# Patient Record
Sex: MALE | Race: OTHER | NOT HISPANIC OR LATINO | URBAN - METROPOLITAN AREA
[De-identification: names, ages, dates, MRNs, and addresses within clinical notes are randomized per-mention and may not be internally consistent; named-entity substitution may affect disease eponyms.]

---

## 2022-08-17 ENCOUNTER — EMERGENCY (EMERGENCY)
Facility: HOSPITAL | Age: 32
LOS: 1 days | Discharge: HOME | End: 2022-08-17
Attending: EMERGENCY MEDICINE
Payer: SELF-PAY

## 2022-08-17 VITALS
DIASTOLIC BLOOD PRESSURE: 96 MMHG | TEMPERATURE: 98 F | HEART RATE: 78 BPM | SYSTOLIC BLOOD PRESSURE: 144 MMHG | RESPIRATION RATE: 18 BRPM | OXYGEN SATURATION: 97 %

## 2022-08-17 PROCEDURE — 12004 RPR S/N/AX/GEN/TRK7.6-12.5CM: CPT

## 2022-08-17 PROCEDURE — 99283 EMERGENCY DEPT VISIT LOW MDM: CPT | Mod: 25

## 2022-08-17 PROCEDURE — 90715 TDAP VACCINE 7 YRS/> IM: CPT

## 2022-08-17 PROCEDURE — 90471 IMMUNIZATION ADMIN: CPT

## 2022-08-17 PROCEDURE — 99053 MED SERV 10PM-8AM 24 HR FAC: CPT

## 2022-08-17 RX ORDER — TETANUS TOXOID, REDUCED DIPHTHERIA TOXOID AND ACELLULAR PERTUSSIS VACCINE, ADSORBED 5; 2.5; 8; 8; 2.5 [IU]/.5ML; [IU]/.5ML; UG/.5ML; UG/.5ML; UG/.5ML
0.5 SUSPENSION INTRAMUSCULAR ONCE
Refills: 0 | Status: COMPLETED | OUTPATIENT
Start: 2022-08-17 | End: 2022-08-17

## 2022-08-17 RX ADMIN — TETANUS TOXOID, REDUCED DIPHTHERIA TOXOID AND ACELLULAR PERTUSSIS VACCINE, ADSORBED 0.5 MILLILITER(S): 5; 2.5; 8; 8; 2.5 SUSPENSION INTRAMUSCULAR at 04:50

## 2022-08-17 NOTE — ED PROVIDER NOTE - NSFOLLOWUPINSTRUCTIONS_ED_ALL_ED_FT
- Please return in 7-10 days for removal of staples    Laceration    A laceration is a cut that goes through all of the layers of the skin and into the tissue that is right under the skin. Some lacerations heal on their own. Others need to be closed with skin adhesive strips, skin glue, stitches (sutures), or staples. Proper laceration care minimizes the risk of infection and helps the laceration to heal better.  If non-absorbable stitches or staples have been placed, they must be taken out within the time frame instructed by your healthcare provider.    SEEK IMMEDIATE MEDICAL CARE IF YOU HAVE ANY OF THE FOLLOWING SYMPTOMS: swelling around the wound, worsening pain, drainage from the wound, red streaking going away from your wound, inability to move finger or toe near the laceration, or discoloration of skin near the laceration.

## 2022-08-17 NOTE — ED PROVIDER NOTE - PATIENT PORTAL LINK FT
You can access the FollowMyHealth Patient Portal offered by [facility] by registering at the following website: [email or web address]. By joining [facility]’s FollowMyHealth portal, you will also be able to view your health information using other applications (apps) compatible with our system.

## 2022-08-17 NOTE — ED ADULT NURSE NOTE - OBJECTIVE STATEMENT
PT A&OX4 states he was at home drinking with GF and tipped and fell and hit his head on a lock. pt currently having lac repaired by MD tetanus shot given and pt being dc'd home after suture repair

## 2022-08-17 NOTE — ED PROVIDER NOTE - PHYSICAL EXAMINATION
Gen: no acute distress  Head: normocephalic, +10cm straight frontal scalp laceration  EENT: EOMI  Lung: no increased work of breathing  MSK: No edema, no visible deformities, full range of motion in all 4 extremities  Neuro: No focal neurologic deficits

## 2022-08-17 NOTE — ED PROVIDER NOTE - OBJECTIVE STATEMENT
32yM with no significant pmh presenting with scalp laceration. Patient reports drinking ETOH today. He reports tripping and scrapping the top of his head on a locker. Denies LOC, blood thinner use, krishna head strike. Last tetanus unknown. Denies N/V, headache, changes in vision, problems with gait.

## 2022-08-17 NOTE — ED PROVIDER NOTE - ATTENDING CONTRIBUTION TO CARE
Lois: I performed a face to face bedside interview with patient regarding history of present illness, review of symptoms and past medical history. I completed an independent physical exam.  I have discussed patient's plan of care with resident.   I agree with note as stated above including HISTORY OF PRESENT ILLNESS, HIV, PAST MEDICAL/SURGICAL/FAMILY/SOCIAL HISTORY, ALLERGIES AND HOME MEDICATIONS, REVIEW OF SYSTEMS, PHYSICAL EXAM, MEDICAL DECISION MAKING and any PROGRESS NOTES during the time I functioned as the attending physician for this patient unless otherwise noted. My brief assessment is as follows: Lois WARE: 32M p/w scalp laceration. Was drinking earlier and fell scraping his scalp. Unknown  tetanus. Denies LOC, vomiting, vision changes, additional injuries. Ambulating well. AAOx3. Plan for tdap, laceration repair.

## 2022-08-17 NOTE — ED ADULT NURSE NOTE - CHIEF COMPLAINT QUOTE
BIBEMS from [place] for laceration s/p mechanical trip and fall. Pt states that he was drinking earlier tonight when he tripped, fell and hit his head on a locker. Large laceration noted to the top of the head, bleeding controlled, clean/dry dressing in place. Denies LOC, denies anticoagulation. Pt is neurologically intact and walking with steady gait, denies HA/dizziness/vision changes and N/V.

## 2022-08-17 NOTE — ED PROVIDER NOTE - CLINICAL SUMMARY MEDICAL DECISION MAKING FREE TEXT BOX
Lois WARE: 32M p/w scalp laceration. Was drinking earlier and fell scraping his scalp. Unknown  tetanus. Denies LOC, vomiting, vision changes, additional injuries. Ambulating well. AAOx3. Plan for tdap, laceration repair.

## 2022-09-07 NOTE — ED ADULT TRIAGE NOTE - CHIEF COMPLAINT QUOTE
[Time Spent: ___ minutes] : I have spent [unfilled] minutes of time on the encounter. BIBEMS from [place] for laceration s/p mechanical trip and fall. Pt states that he was drinking earlier tonight when he tripped, fell and hit his head on a locker. Large laceration noted to the top of the head, bleeding controlled, clean/dry dressing in place. Denies LOC, denies anticoagulation. Pt is neurologically intact and walking with steady gait, denies HA/dizziness/vision changes and N/V.
